# Patient Record
Sex: MALE | Race: WHITE | URBAN - METROPOLITAN AREA
[De-identification: names, ages, dates, MRNs, and addresses within clinical notes are randomized per-mention and may not be internally consistent; named-entity substitution may affect disease eponyms.]

---

## 2019-05-21 ENCOUNTER — OFFICE VISIT (OUTPATIENT)
Dept: URBAN - METROPOLITAN AREA CLINIC 11 | Facility: CLINIC | Age: 49
End: 2019-05-21
Payer: COMMERCIAL

## 2019-05-21 PROCEDURE — 92310 CONTACT LENS FITTING OU: CPT | Performed by: OPTOMETRIST

## 2019-05-21 PROCEDURE — 92015 DETERMINE REFRACTIVE STATE: CPT | Performed by: OPTOMETRIST

## 2019-05-21 PROCEDURE — 92002 INTRM OPH EXAM NEW PATIENT: CPT | Performed by: OPTOMETRIST

## 2019-05-21 ASSESSMENT — KERATOMETRY
OD: 43.00
OS: 43.25

## 2019-05-21 ASSESSMENT — VISUAL ACUITY
OD: 20/20
OS: 20/20

## 2019-05-29 ENCOUNTER — TESTING ONLY (OUTPATIENT)
Dept: URBAN - METROPOLITAN AREA CLINIC 11 | Facility: CLINIC | Age: 49
End: 2019-05-29

## 2019-05-29 PROCEDURE — 92310 CONTACT LENS FITTING OU: CPT | Performed by: OPTOMETRIST

## 2020-10-14 ENCOUNTER — OFFICE VISIT (OUTPATIENT)
Dept: URBAN - METROPOLITAN AREA CLINIC 11 | Facility: CLINIC | Age: 50
End: 2020-10-14
Payer: COMMERCIAL

## 2020-10-14 DIAGNOSIS — H52.4 PRESBYOPIA: Primary | ICD-10-CM

## 2020-10-14 PROCEDURE — 92310 CONTACT LENS FITTING OU: CPT | Performed by: OPTOMETRIST

## 2020-10-14 PROCEDURE — 92012 INTRM OPH EXAM EST PATIENT: CPT | Performed by: OPTOMETRIST

## 2020-10-14 ASSESSMENT — KERATOMETRY
OD: 43.25
OS: 43.25

## 2020-10-14 ASSESSMENT — VISUAL ACUITY
OD: 20/20
OS: 20/20

## 2022-02-17 ENCOUNTER — OFFICE VISIT (OUTPATIENT)
Dept: URBAN - METROPOLITAN AREA CLINIC 11 | Facility: CLINIC | Age: 52
End: 2022-02-17
Payer: COMMERCIAL

## 2022-02-17 PROCEDURE — 92310 CONTACT LENS FITTING OU: CPT | Performed by: OPTOMETRIST

## 2022-02-17 PROCEDURE — 92012 INTRM OPH EXAM EST PATIENT: CPT | Performed by: OPTOMETRIST

## 2022-02-17 ASSESSMENT — VISUAL ACUITY
OD: 20/20
OS: 20/20

## 2022-02-17 ASSESSMENT — INTRAOCULAR PRESSURE
OS: 21
OD: 12

## 2022-02-17 ASSESSMENT — KERATOMETRY
OS: 42.00
OD: 43.00

## 2023-02-16 ENCOUNTER — OFFICE VISIT (OUTPATIENT)
Facility: LOCATION | Age: 53
End: 2023-02-16
Payer: COMMERCIAL

## 2023-02-16 DIAGNOSIS — E11.9 TYPE 2 DIABETES MELLITUS W/O COMPLICATION: ICD-10-CM

## 2023-02-16 DIAGNOSIS — H52.4 PRESBYOPIA: Primary | ICD-10-CM

## 2023-02-16 PROCEDURE — 92310 CONTACT LENS FITTING OU: CPT

## 2023-02-16 PROCEDURE — 92004 COMPRE OPH EXAM NEW PT 1/>: CPT

## 2023-02-16 ASSESSMENT — INTRAOCULAR PRESSURE
OS: 13
OD: 14

## 2023-02-16 ASSESSMENT — KERATOMETRY
OD: 43.38
OS: 43.25

## 2023-02-16 ASSESSMENT — VISUAL ACUITY
OD: 20/20
OS: 20/20

## 2023-02-16 NOTE — IMPRESSION/PLAN
Impression: Type 2 diabetes mellitus w/o complication: V20.0. Plan: Pt educated on findings. Stressed importance of BS/BP control and continued care with PCP/endocrinologist. Will write letter to PCP/endocrinologist regarding exam findings.  Monitor annually with DFE with mac OCT/optos

## 2023-02-16 NOTE — IMPRESSION/PLAN
Impression: Presbyopia: H52.4. Plan: Pt educated on minimal change to SRx today. Finalized SRx released. RTC 1-year for CEE; or sooner prn. Finalized CLRx released today. Discussed with patient the importance of good contact lens hygiene, including not sleeping, showering, or swimming in the lenses. If a red eye or pain is noted, instructed patient to notify the clinic and discontinue CL wear. Pt expressed understanding. RTC 1-year for CEE with CL fit; or sooner prn.